# Patient Record
Sex: FEMALE | Race: WHITE | Employment: OTHER | URBAN - METROPOLITAN AREA
[De-identification: names, ages, dates, MRNs, and addresses within clinical notes are randomized per-mention and may not be internally consistent; named-entity substitution may affect disease eponyms.]

---

## 2021-01-28 ENCOUNTER — HOSPITAL ENCOUNTER (OUTPATIENT)
Dept: PHYSICAL THERAPY | Age: 86
End: 2021-01-28

## 2021-02-04 ENCOUNTER — HOSPITAL ENCOUNTER (OUTPATIENT)
Dept: PHYSICAL THERAPY | Age: 86
Discharge: HOME | End: 2021-02-04
Payer: MEDICARE

## 2021-02-04 PROCEDURE — 97162 PT EVAL MOD COMPLEX 30 MIN: CPT

## 2021-02-04 PROCEDURE — 97110 THERAPEUTIC EXERCISES: CPT

## 2021-02-04 NOTE — PROGRESS NOTES
[address] [place] [address] PHYSICAL THERAPY   [address],[facility], [facility] - Phone: [phone]  Fax: [phone] [facility]  Patient Name: New Weber : 1934   Medical   Diagnosis: Lymphedema [I89.0] Treatment Diagnosis: Lymphedema [I89.0]   Onset Date: 6 months ago     Referral Source: Bina Potts MD Start of [facility]): 2021   Prior Hospitalization: See medical history Provider #: [id number]   Prior Level of Function: I with ADL's, no use of AD, walks 1 mile daily   Comorbidities: HBP, Arthritis, Afib, Pacemaker   Medications: Verified on Patient Summary List   The Plan of Care and following information is based on the information from the initial evaluation.   ==================================================================================  Assessment / key information:  New Weber is a 80 y.o.  yo female with Generalized weakness of the BL LE's as well as Lymphedema in the BL LE's. Pt reports current symptoms include: heaviness, tightness, pain in the back of the legs and calves, and difficulty with doing daily activities. Pt reports pain ranges 0-6/10 located on the sides of the knees, back of the knees, and bottoms of the feet. Past medical history includes: Pacemaker, Afib, HBP, Arthritis. Functional limitations include: difficulty walking long distances, difficulty with ADL's such as housework,. Pt has 2 story house with 5 stairs to enter the home. Pt lives with  and wears compression socks daily. Pt walk dog 1 mile daily (weather dependent).   Current Exam findings: Posture: mild FHRS posture in standing, with mild decrease in lumbar lordosis, Gait decreased loi with slightly widened JAZZY with no use of AD, AROM: hip ROM limited by 25% in all directions, knee AROM limited by 25% in all directions, ankle limited by 25% in all directions, Gross Strength: Gross hip 4/5 in all directions, knee 4/5 in all directions, ankle 5/5 in all directions, Skin Integrity: mild dry texture with no noted pitting or skin changes, Sensation: intact to light touch in the feet and lower legs. Circumferential Measurements are listed below:   Left Right   MTP 20 21   Navicular 24.5 25   Ankle 25 24   Calf 40 39   Knee 40 39.5      The patient was instructed in a home exercise program to address the above findings/deficits. Pt will benefit from PT interventions to address the aforementioned deficits and allow pt to return to PLOF. Pt's therapy may include: manual lymphatic drainage, compression wrapping,  decongestive exercises, and patient education on skin care, signs and symptoms of infection, and self-management upon DC.      ==================================================================================  Eval Complexity: History HIGH Complexity :3+ comorbidities / personal factors will impact the outcome/ POC ;  Examination  HIGH Complexity : 4+ Standardized tests and measures addressing body structure, function, activity limitation and / or participation in recreation ; Presentation MEDIUM Complexity : Evolving with changing characteristics ; Decision Making Other outcome measures Professional judgement  MEDIUM;  Overall Complexity MEDIUM  Problem List: pain affecting function, decrease ROM, decrease strength, edema affecting function, impaired gait/ balance, decrease ADL/ functional abilitiies, decrease activity tolerance, decrease flexibility/ joint mobility and decrease transfer abilities   Treatment Plan may include any combination of the following: Therapeutic exercise, Therapeutic activities, Neuromuscular re-education, Physical agent/modality, Gait/balance training, Manual therapy, Aquatic therapy, Patient education, Self Care training, Functional mobility training, Home safety training, and Stair training  Patient / Family readiness to learn indicated by: asking questions, trying to perform skills, and interest  Persons(s) to be included in education: patient (P)   Barriers to Learning/Limitations: None  Measures taken, if barriers to learning:    Patient Goal (s): Improve leg pain   Patient self reported health status: good  Rehabilitation Potential: good  Goals:    Short Term Goals: To be accomplished in 6 treatments  1) Pt will be I with HEP in order to improve carryover from therapy. 2) Pt will report max leg pain <3/10 in the feet and lower legs in order to improve functional ADL's    Long Term Goals: To be accomplished in 12 treatments  1)Pt will be I with long term HEP in order to prepare for DC to self management. 3) Pt will increase ROM of affected limb by 25% in order to improve functional ADL's  4) Pt will increase strength of affected limb by 1/2 MMT in order to improve gait and ambulation     Frequency / Duration:   Patient to be seen  1-2  times per week for 8-12  treatments:  Patient / Caregiver education and instruction: exercises  Therapist Signature: Yecenia Mills PT, DPT   Date: 8/6/0345   Certification Period: 2/4/2021-5/5/2021 Time: 5:33 PM   ==================================================================================  I certify that the above Physical Therapy Services are being furnished while the patient is under my care. I agree with the treatment plan and certify that this therapy is necessary. Physician Signature:        Date:       Time:     Please sign and return to [facility] at [facility], [facility]. or you may fax the signed copy to [phone]. Thank you.

## 2021-02-04 NOTE — PROGRESS NOTES
PHYSICAL THERAPY - INITIAL EVALUATION & TREATMENT NOTE    Patient Name: Lili Buenrostro        Date: 2021  : 1934   yes Patient  Verified  Visit #:   1     Insurance: Payor: Ellie Duron / Plan: [place] MED[place] PART [place] & B / Product Type: Medicare /      In time: 500 Out time: 540   Total Treatment Time: 40     Medicare/BCBS Time Tracking (below)   Total Timed Codes (min):  10 1:1 Treatment Time:  10     TREATMENT AREA =  Lymphedema [I89.0]    SUBJECTIVE  Pain Level (on 0 to 10 scale):  0  / 10   Medication Changes/New allergies or changes in medical history, any new surgeries or procedures?    no  If yes, update Summary List   Subjective Functional Status/Changes:  []  No changes reported     SEE POC         OBJECTIVE    30 min Evaluation- SEE POC     10 min Therapeutic Exercise: see flow sheet   Rationale: To increased ROM, strength, decreased edema to improve the patients ability to perform functional ADL's    Billed With/As:   [] TE   [x] TA   [] Neuro   [] Self Care Patient Education: [x] Review HEP     [x] other: PATIENT GIVEN LYMPHEDEMA INFORMATION PACKET     Other Objective/Functional Measures:    SEE POC     Post Treatment Pain Level (on 0 to 10) scale:   0  / 10     ASSESSMENT  Assessment/Changes in Function:        Evaluation Code Complexity: History HIGH Complexity :3+ comorbidities / personal factors will impact the outcome/ POC ; Examination HIGH Complexity : 4+ Standardized tests and measures addressing body structure, function, activity limitation and / or participation in recreation ; Presentation MEDIUM Complexity : Evolving with changing characteristics ; Decision Making Other outcome measures professional judgement  MEDIUM;  Complexity MEDIUM    Justification for Eval Code Complexity:  Patient History : pacemaker, HBP, Afib  Examination SEE ABOVE EXAM  Clinical Presentation: evolving pain, weakness, edema  Clinical Decision Making : professional judgement         []  See Progress Note/Recertification   Patient will continue to benefit from skilled PT services to modify and progress therapeutic interventions, address functional mobility deficits, address ROM deficits, address strength deficits, analyze and address soft tissue restrictions and address lymphedema to attain remaining goals. Progress toward goals / Updated goals:    SEE POC     PLAN  []  Upgrade activities as tolerated yes Continue plan of care   []  Discharge due to :    [x]  Other: Pt will be seen 1-2 times per week for 12 sessions.       Therapist: Mayuri Ceja PT, DPT    Date: 2/4/2021 Time: 5:33 PM     Future Appointments   Date Time Provider Willard Jimenes   2/9/2021 11:15 AM Germán Scanlon, PT [facility]   2/15/2021 11:30 AM Germán Scanlon, PT [facility]   2/17/2021 11:30 AM Germán Scanlon, PT [facility]   2/23/2021 11:15 AM Germán Scanlon, PT [facility]   2/26/2021 11:00 AM Kendal SEYMOUR [facility]

## 2021-02-09 ENCOUNTER — HOSPITAL ENCOUNTER (OUTPATIENT)
Dept: PHYSICAL THERAPY | Age: 86
Discharge: HOME | End: 2021-02-09
Payer: MEDICARE

## 2021-02-09 PROCEDURE — 97530 THERAPEUTIC ACTIVITIES: CPT

## 2021-02-09 PROCEDURE — 97535 SELF CARE MNGMENT TRAINING: CPT

## 2021-02-09 NOTE — PROGRESS NOTES
PHYSICAL THERAPY - DAILY TREATMENT NOTE    Patient Name: Christa Pruett        Date: 2021  : 1934   yes Patient  Verified  Visit #:     Insurance: Payor: Joanne Barfield / Plan: [place] MEDICARE PART A & B / Product Type: Medicare /      In time: 1120 am Out time: 5052 am   Total Treatment Time: 36     Medicare/BCBS Time Tracking (below)   Total Timed Codes (min):  26 1:1 Treatment Time:  26     TREATMENT AREA =  Lymphedema [I89.0]    SUBJECTIVE  Pain Level (on 0 to 10 scale):  0 / 10   Medication Changes/New allergies or changes in medical history, any new surgeries or procedures?    no  If yes, update Summary List   Subjective Functional Status/Changes:  []  No changes reported     Pt reports no significant changes since IE. OBJECTIVE  Modalities Rationale: decrease pain and increase tissue extensibility to improve patient's ability to perform LE functional tasks and ADL   min [] Estim, type/location:                                     []  att     []  unatt     []  w/US     []  w/ice    []  w/heat    min []  Mechanical Traction: type/lbs                   []  pro   []  sup   []  int   []  cont    []  before manual    []  after manual    min []  Ultrasound, settings/location:      min []  Iontophoresis w/ dexamethasone, location:                                               []  take home patch       []  in clinic   10 min []  Ice     [x]  Heat    location/position:  To bilat knees s/p session in supine H/L    min []  Vasopneumatic Device, press/temp:     min []  Other:    [x] Skin assessment post-treatment (if applicable):    [x]  intact    []  redness- no adverse reaction     []redness  adverse reaction:        8 min Therapeutic Exercise:  [x]  See flow sheet   Rationale:      increase ROM and increase strength to improve the patient's ability to perform LE functional tasks and ADL     9 min Therapeutic Activity: [x]  See flow sheet   Rationale:    increase strength, improve coordination, improve balance and increase proprioception to improve the patient's ability to perform LE functional tasks and ADL    8 min Self Care: See pt education   Rationale:    Pt education to improve the patient's ability to perform exercises, adhere to PT POC    Billed With/As:  [] TE  [] TA  [] Neuro  [x] Self Care Patient Education: [x] Review HEP    [] Progressed/Changed HEP based on:   [x] positioning   [x] body mechanics   [] transfers   [x] heat/ice application    [] other:     Other Objective/Functional Measures: Added exercises per flow sheet     Post Treatment Pain Level (on 0 to 10) scale:   0  / 10     ASSESSMENT  Assessment/Changes in Function:     No adverse effects noted with treatment this date. Demo/tactile cues provided for all new exercises to ensure proper form. Cont per POC. []  See Progress Note/Recertification   Patient will continue to benefit from skilled PT services to modify and progress therapeutic interventions, address functional mobility deficits, address ROM deficits, address strength deficits, analyze and address soft tissue restrictions, analyze and cue movement patterns, analyze and modify body mechanics/ergonomics, assess and modify postural abnormalities, address imbalance/dizziness and instruct in home and community integration to attain remaining goals.    Progress toward goals / Updated goals:    First f/u s/p IE     PLAN  [x]  Upgrade activities as tolerated yes Continue plan of care   []  Discharge due to :    []  Other:      Therapist: Grace Garcia PT    Date: 2/9/2021 Time: 11:53 AM     Future Appointments   Date Time Provider Willard Jimenes   2/15/2021 11:30 AM Wilfred Guido PT [facility]   2/17/2021 11:30 AM Wilfred Guido PT [facility]   2/23/2021 11:15 AM Wilfred Guido PT [facility]   2/26/2021 11:00 AM Allison SEYMOUR [facility]

## 2021-02-15 ENCOUNTER — APPOINTMENT (OUTPATIENT)
Dept: PHYSICAL THERAPY | Age: 86
End: 2021-02-15
Payer: MEDICARE

## 2021-02-17 ENCOUNTER — HOSPITAL ENCOUNTER (OUTPATIENT)
Dept: PHYSICAL THERAPY | Age: 86
Discharge: HOME | End: 2021-02-17
Payer: MEDICARE

## 2021-02-17 PROCEDURE — 97530 THERAPEUTIC ACTIVITIES: CPT

## 2021-02-17 PROCEDURE — 97535 SELF CARE MNGMENT TRAINING: CPT

## 2021-02-17 NOTE — PROGRESS NOTES
PHYSICAL THERAPY - DAILY TREATMENT NOTE    Patient Name: Lucien Fowler        Date: 2021  : 1934   yes Patient  Verified  Visit #:   3   of   12  Insurance: Payor: Kumar Alt / Plan: [place] MEDICARE PART A & B / Product Type: Medicare /      In time: 5368 am Out time: 2924 pm   Total Treatment Time: 43     Medicare/BCBS Time Tracking (below)   Total Timed Codes (min):  33 1:1 Treatment Time:  33     TREATMENT AREA =  Lymphedema [I89.0]    SUBJECTIVE  Pain Level (on 0 to 10 scale):  0 / 10   Medication Changes/New allergies or changes in medical history, any new surgeries or procedures?    no  If yes, update Summary List   Subjective Functional Status/Changes:  []  No changes reported     Pt reports no pain this date. OBJECTIVE  Modalities Rationale: decrease pain and increase tissue extensibility to improve patient's ability to perform LE functional tasks and ADL   min [] Estim, type/location:                                     []  att     []  unatt     []  w/US     []  w/ice    []  w/heat    min []  Mechanical Traction: type/lbs                   []  pro   []  sup   []  int   []  cont    []  before manual    []  after manual    min []  Ultrasound, settings/location:      min []  Iontophoresis w/ dexamethasone, location:                                               []  take home patch       []  in clinic   10 min []  Ice     [x]  Heat    location/position:  To bilat knees s/p session in supine H/L    min []  Vasopneumatic Device, press/temp:     min []  Other:    [x] Skin assessment post-treatment (if applicable):    [x]  intact    []  redness- no adverse reaction     []redness  adverse reaction:        14  NC min Therapeutic Exercise:  [x]  See flow sheet   Rationale:      increase ROM and increase strength to improve the patient's ability to perform LE functional tasks and ADL     11 min Therapeutic Activity: [x]  See flow sheet   Rationale:    increase strength, improve coordination, improve balance and increase proprioception to improve the patient's ability to perform LE functional tasks and ADL    8 min Self Care: See pt education   Rationale:    Pt education to improve the patient's ability to perform exercises, adhere to PT POC    Billed With/As:  [] TE  [] TA  [] Neuro  [x] Self Care Patient Education: [x] Review HEP    [] Progressed/Changed HEP based on:   [x] positioning   [x] body mechanics   [] transfers   [x] heat/ice application    [] other:     Other Objective/Functional Measures: Added exercises per flow sheet     Post Treatment Pain Level (on 0 to 10) scale:   4  / 10     ASSESSMENT  Assessment/Changes in Function:     No adverse effects noted with treatment this date. Visual input via mirror provided for equal WB bilat during mini squats. Verbal cues and demo also provided for proper knee excursion to dec ant stress on knee. Progress as fatemeh. []  See Progress Note/Recertification   Patient will continue to benefit from skilled PT services to modify and progress therapeutic interventions, address functional mobility deficits, address ROM deficits, address strength deficits, analyze and address soft tissue restrictions, analyze and cue movement patterns, analyze and modify body mechanics/ergonomics, assess and modify postural abnormalities, address imbalance/dizziness and instruct in home and community integration to attain remaining goals. Progress toward goals / Updated goals:    · To be accomplished in 6 treatments:  1) Pt will be I with HEP in order to improve carryover from therapy.   2) Pt will report max leg pain <3/10 in the feet and lower legs in order to improve functional ADL's       PLAN  [x]  Upgrade activities as tolerated yes Continue plan of care   []  Discharge due to :    []  Other:      Therapist: Tri Olmstead PT    Date: 2/17/2021 Time: 11:30 AM     Future Appointments   Date Time Provider Willard Jimenes   2/23/2021 11:15 AM Chastity Killian, PT [facility] 2/26/2021 11:00 AM Mariia Reeves [facility]

## 2021-02-23 ENCOUNTER — APPOINTMENT (OUTPATIENT)
Dept: PHYSICAL THERAPY | Age: 86
End: 2021-02-23
Payer: MEDICARE

## 2021-02-26 ENCOUNTER — HOSPITAL ENCOUNTER (OUTPATIENT)
Dept: PHYSICAL THERAPY | Age: 86
Discharge: HOME | End: 2021-02-26
Payer: MEDICARE

## 2021-02-26 PROCEDURE — 97110 THERAPEUTIC EXERCISES: CPT

## 2021-02-26 NOTE — PROGRESS NOTES
PHYSICAL THERAPY - DAILY TREATMENT NOTE    Patient Name: Christa Pruett        Date: 2021  : 1934   yes Patient  Verified  Visit #:      12  Insurance: Payor: Joanne Barfield / Plan: [place] MEDICARE PART A & B / Product Type: Medicare /      In time: 11:10 Out time: 11:47   Total Treatment Time: 37     Medicare/BCBS Time Tracking (below)   Total Timed Codes (min):  27 1:1 Treatment Time:  27     TREATMENT AREA =  Lymphedema [I89.0]    SUBJECTIVE  Pain Level (on 0 to 10 scale): 0 / 10   Medication Changes/New allergies or changes in medical history, any new surgeries or procedures?    no  If yes, update Summary List   Subjective Functional Status/Changes:  []  No changes reported     Patient feels like her legs are always heavy (L>R) and makes it difficult for her to walk or stand for a long time. States that she notices posterior thigh pain randomly throughout her day. Denies any falls or red flags since LV. OBJECTIVE  Modalities Rationale: decrease pain and increase tissue extensibility to improve patient's ability to perform LE functional tasks and ADL   min [] Estim, type/location:                                     []  att     []  unatt     []  w/US     []  w/ice    []  w/heat    min []  Mechanical Traction: type/lbs                   []  pro   []  sup   []  int   []  cont    []  before manual    []  after manual    min []  Ultrasound, settings/location:      min []  Iontophoresis w/ dexamethasone, location:                                               []  take home patch       []  in clinic   10 min []  Ice     [x]  Heat    location/position:  To bilat knees s/p session in supine H/L    min []  Vasopneumatic Device, press/temp:     min []  Other:    [x] Skin assessment post-treatment (if applicable):    [x]  intact    []  redness- no adverse reaction     []redness  adverse reaction:        27 min Therapeutic Exercise:  [x]  See flow sheet   Rationale:      increase ROM and increase strength to improve the patient's ability to perform LE functional tasks and ADL       Billed With/As:  [x] TE  [] TA  [] Neuro  [] Self Care Patient Education: [x] Review HEP    [] Progressed/Changed HEP based on:   [] positioning   [] body mechanics   [] transfers   [] heat/ice application    [] other:     Other Objective/Functional Measures:    1:1 TE = 32'    *challenged with equal weightbearing during mini squats. Slight improvement with weight distribution with mirror. Performed sit to stands at high table instead. *added incline board due to pt's c/o \"heaviness\" and \"tightness\" in the posterior LE     Post Treatment Pain Level (on 0 to 10) scale:  0  / 10     ASSESSMENT  Assessment/Changes in Function:     Patient would continue to benefit from skilled PT in order to perform walking activities with decrease fall risk and improved functional strength/endurance. []  See Progress Note/Recertification   Patient will continue to benefit from skilled PT services to modify and progress therapeutic interventions, address functional mobility deficits, address ROM deficits, address strength deficits, analyze and address soft tissue restrictions, analyze and cue movement patterns, analyze and modify body mechanics/ergonomics, assess and modify postural abnormalities, address imbalance/dizziness and instruct in home and community integration to attain remaining goals. Progress toward goals / Updated goals:    · To be accomplished in 6 treatments:  1) Pt will be I with HEP in order to improve carryover from therapy.   2) Pt will report max leg pain <3/10 in the feet and lower legs in order to improve functional ADL's       PLAN  [x]  Upgrade activities as tolerated yes Continue plan of care   []  Discharge due to :    []  Other:      Therapist: GILL Llanos    Date: 2/26/2021 Time: 11:42 AM     Future Appointments   Date Time Provider Willard Jimenes   2/26/2021 11:00 AM Felix Hudson 3/2/2021  3:00 PM Hunter Arboleda,  [facility]   3/9/2021  2:45 PM Ethan Pool, PT [place] 1943

## 2021-03-01 ENCOUNTER — APPOINTMENT (OUTPATIENT)
Dept: PHYSICAL THERAPY | Age: 86
End: 2021-03-01
Payer: MEDICARE

## 2021-03-02 ENCOUNTER — HOSPITAL ENCOUNTER (OUTPATIENT)
Dept: PHYSICAL THERAPY | Age: 86
Discharge: HOME | End: 2021-03-02
Payer: MEDICARE

## 2021-03-02 PROCEDURE — 97110 THERAPEUTIC EXERCISES: CPT

## 2021-03-02 PROCEDURE — 97535 SELF CARE MNGMENT TRAINING: CPT

## 2021-03-02 NOTE — PROGRESS NOTES
[facility] PHYSICAL THERAPY   [facility] [address],[place], [address] [facility] - Phone: [phone]  Fax: [phone] PHYSICAL THERAPY          Patient Name: [place] : 1934   Treatment/Medical Diagnosis: Lymphedema [I89.0]   Onset Date: 6 months ago    Referral Source: Jessica Montoya MD Start of Care [facility]): 21   Prior Hospitalization: See Medical History Provider #: [id number]   Prior Level of Function: I with ADL's, no use of AD, walks 1 mile daily   Comorbidities: HTN, Arthritis, Afib, Pacemaker   Medications: Verified on Patient Summary List   Visits from [facility]: 5 Missed Visits: 1     SUMMARY OF TREATMENT  Skilled PT services have consisted of: ther ex, neuro re-ed, ther act, pt education, HEP, moist heat  CURRENT STATUS  Patient has made slow, steady progress with PT interventions for bilat LE lymphedema. Patient reports ~40% overall improvement since beginning PT. In the last 2 weeks, pain has ranged between 0-7/10 with an avg pain of 5/10. Notices most improvement with pain in RLE. Significant functional improvement denoted with +3 on GROC. Hip PROM: flex WNL bilat, ER R 40 L 35, IR R 25 L 35  Knee AROM: flex R 120 L 110, ext R -7 L -15  Ankle AROM: grossly WFL bilat with some discomfort L ankle t/o    Aggravating factors: prolonged walking    Goal/Measure of Progress Goal Met? 1. Pt will be I with HEP in order to improve carryover from therapy. Status at Last Eval: New goal Current Status: Pt performs 2x/dailty yes   2. Pt will report max leg pain <3/10 in the feet and lower legs in order to improve functional ADL's   Status at Last Eval: Max pain: 6/10 Current Status: Max pain: 7/10 no   3.  Pt will increase ROM of affected limb by 25% in order to improve functional ADL's   Status at Last Eval: Grossly limited 25% bilat Current Status: See above Progressing       Key Functional Changes/Progress: See above  Problem List: pain affecting function, decrease ROM, decrease strength, edema affecting function, impaired gait/ balance, decrease ADL/ functional abilitiies, decrease activity tolerance, decrease flexibility/ joint mobility and decrease transfer abilities   Treatment Plan may include any combination of the following: Therapeutic exercise, Therapeutic activities, Neuromuscular re-education, Physical agent/modality, Gait/balance training, Manual therapy, Patient education, Self Care training, Functional mobility training, Home safety training and Stair training  Patient Goal(s) has/have been updated and include(s):      Goals for this certification period include and are to be achieved in  4  weeks:  1) Pt will report max leg pain <3/10 in the feet and lower legs in order to improve functional ADL's  2) Pt will increase ROM of affected limb by 25% in order to improve functional ADL's  3) Pt will be I with long term HEP in order to prepare for DC to self management. 4) PT to demo bilat knee AROM WNL without pain in order to have improved functional mobility. Frequency / Duration:   Patient to be seen   1-2   times per week for   4    weeks:  Assessments/Recommendations: Continue seeing pt 1-2x/week x4 more weeks before reassess. If you have any questions/comments please contact us directly at [phone]. Thank you for allowing us to assist in the care of your patient.     Therapist Signature: Deryl Hodgkin, PT Date: 8/1/7078   Certification Period:  Reporting Period: 2/4/21 to 5/5/21 2/4/21 to 3/2/2021 Time: 3:56 PM   NOTE TO PHYSICIAN:  PLEASE COMPLETE THE ORDERS BELOW AND FAX TO   [facility] Physical Therapy: [phone]  If you are unable to process this request in 24 hours please contact our office: [phone]    ___ I have read the above report and request that my patient continue as recommended.   ___ I have read the above report and request that my patient continue therapy with the following changes/special instructions: ________________________________________________   ___ I have read the above report and request that my patient be discharged from therapy. I certify that the above Physical Therapy Services are being furnished while the patient is under my care. I agree with the treatment plan and certify that this therapy is necessary.     Physician Signature:        Date:       Time:                                        Sonido Patel MD

## 2021-03-02 NOTE — PROGRESS NOTES
PHYSICAL THERAPY - DAILY TREATMENT NOTE    Patient Name: Mode Romo        Date: 3/2/2021  : 1934   yes Patient  Verified  Visit #:      12  Insurance: Payor: Gilmer Keep / Plan: [place] MED[place] PART A & B / Product Type: Medicare /      In time: 302 pm Out time: 352 pm   Total Treatment Time: 50     Medicare/BCBS Time Tracking (below)   Total Timed Codes (min):  40 1:1 Treatment Time:  40     TREATMENT AREA =  Lymphedema [I89.0]    SUBJECTIVE  Pain Level (on 0 to 10 scale):  5 / 10   Medication Changes/New allergies or changes in medical history, any new surgeries or procedures?    no  If yes, update Summary List   Subjective Functional Status/Changes:  []  No changes reported     See Progress Note       OBJECTIVE  Modalities Rationale: decrease pain and increase tissue extensibility to improve patient's ability to perform LE functional tasks and ADL   min [] Estim, type/location:                                     []  att     []  unatt     []  w/US     []  w/ice    []  w/heat    min []  Mechanical Traction: type/lbs                   []  pro   []  sup   []  int   []  cont    []  before manual    []  after manual    min []  Ultrasound, settings/location:      min []  Iontophoresis w/ dexamethasone, location:                                               []  take home patch       []  in clinic   10 min []  Ice     [x]  Heat    location/position:  To post knee bilat in supine H/L s/p session    min []  Vasopneumatic Device, press/temp:     min []  Other:    [x] Skin assessment post-treatment (if applicable):    [x]  intact    []  redness- no adverse reaction     []redness  adverse reaction:        15 min Therapeutic Exercise:  [x]  See flow sheet   Rationale:      increase ROM and increase strength to improve the patient's ability to perform functional tasks and ADL      25 min Self Care: See pt education   Rationale:    Pt education to improve the patient's ability to adhere to PT POC    Billed With/As:  [] TE  [] TA  [] Neuro  [x] Self Care Patient Education: [x] Review HEP    [] Progressed/Changed HEP based on:   [] positioning   [] body mechanics   [] transfers   [] heat/ice application    [x] other: PT progress, POC     Other Objective/Functional Measures:    See Progress Note     Post Treatment Pain Level (on 0 to 10) scale:   0  / 10     ASSESSMENT  Assessment/Changes in Function:     See Progress Note     [x]  See Progress Note/Recertification   Patient will continue to benefit from skilled PT services to modify and progress therapeutic interventions, address functional mobility deficits, address ROM deficits, address strength deficits, analyze and address soft tissue restrictions, analyze and cue movement patterns, analyze and modify body mechanics/ergonomics, assess and modify postural abnormalities, address imbalance/dizziness and instruct in home and community integration to attain remaining goals.    Progress toward goals / Updated goals:    See Progress Note     PLAN  [x]  Upgrade activities as tolerated yes Continue plan of care   []  Discharge due to :    []  Other:      Therapist: Hitesh Lopez, PT    Date: 3/2/2021 Time: 2:58 PM     Future Appointments   Date Time Provider Willard Jimenes   3/2/2021  3:00 PM Isabella Thomson, PT [facility]   3/9/2021  2:45 PM Leatha Pool, PT [facility]

## 2021-03-09 ENCOUNTER — HOSPITAL ENCOUNTER (OUTPATIENT)
Dept: PHYSICAL THERAPY | Age: 86
Discharge: HOME | End: 2021-03-09
Payer: MEDICARE

## 2021-03-09 PROCEDURE — 97140 MANUAL THERAPY 1/> REGIONS: CPT

## 2021-03-09 PROCEDURE — 97110 THERAPEUTIC EXERCISES: CPT

## 2021-03-09 NOTE — PROGRESS NOTES
PHYSICAL THERAPY - DAILY TREATMENT NOTE    Patient Name: Francisco Sanchez        Date: 3/9/2021  : 1934   yes Patient  Verified  Visit #:      of   12  Insurance: Payor: Colton Brewer / Plan: [place] MEDICARE PART A & B / Product Type: Medicare /      In time: 245 Out time: 340   Total Treatment Time: 55     Medicare/BCBS Time Tracking (below)   Total Timed Codes (min):  45 1:1 Treatment Time:  45     TREATMENT AREA =  Lymphedema [I89.0]    SUBJECTIVE  Pain Level (on 0 to 10 scale):  7/ 10   Medication Changes/New allergies or changes in medical history, any new surgeries or procedures?    no  If yes, update Summary List   Subjective Functional Status/Changes:  []  No changes reported     Pt reports pain and tightness in the BL knees       OBJECTIVE  Modalities Rationale: decrease pain and increase tissue extensibility to improve patient's ability to perform LE functional tasks and ADL   min [] Estim, type/location:                                     []  att     []  unatt     []  w/US     []  w/ice    []  w/heat    min []  Mechanical Traction: type/lbs                   []  pro   []  sup   []  int   []  cont    []  before manual    []  after manual    min []  Ultrasound, settings/location:      min []  Iontophoresis w/ dexamethasone, location:                                               []  take home patch       []  in clinic   10 min []  Ice     [x]  Heat    location/position: To post knee bilat in supine H/L s/p session    min []  Vasopneumatic Device, press/temp:     min []  Other:    [x] Skin assessment post-treatment (if applicable):    [x]  intact    []  redness- no adverse reaction     []redness  adverse reaction:        35 min Therapeutic Exercise:  [x]  See flow sheet   Rationale:      increase ROM and increase strength to improve the patient's ability to perform functional tasks and ADL      10 min Manual Therapy STM BL quad, ITB, posterior knee.  PROM into flexion BL   Rationale:    Pt education to improve the patient's ability to adhere to PT POC    Billed With/As:  [] TE  [] TA  [] Neuro  [x] Self Care Patient Education: [x] Review HEP    [] Progressed/Changed HEP based on:   [] positioning   [] body mechanics   [] transfers   [] heat/ice application    [x] other: PT progress, POC     Other Objective/Functional Measures:    Modified program today due to patient not feeling progress as well as increased pain noted. Significant tightness of the L knee today     Standing TE held today due to higher pain levels over the last week- see flow sheet. Post Treatment Pain Level (on 0 to 10) scale:   0  / 10     ASSESSMENT  Assessment/Changes in Function:     Will continue with modified TE to progress towards decreased pain with walking/standing     [x]  See Progress Note/Recertification   Patient will continue to benefit from skilled PT services to modify and progress therapeutic interventions, address functional mobility deficits, address ROM deficits, address strength deficits, analyze and address soft tissue restrictions, analyze and cue movement patterns, analyze and modify body mechanics/ergonomics, assess and modify postural abnormalities, address imbalance/dizziness and instruct in home and community integration to attain remaining goals.    Progress toward goals / Updated goals:    PN last visit     PLAN  [x]  Upgrade activities as tolerated yes Continue plan of care   []  Discharge due to :    []  Other:      Therapist: Jacques Kenney PT    Date: 3/9/2021 Time: 2:58 PM     Future Appointments   Date Time Provider Willard Jimenes   3/9/2021  2:45 PM [id number]   3/11/2021 11:30 AM Jerod Pool, [address] [address]   3/16/2021  3:00 PM Sole , PT [facility]   3/18/2021 12:15 PM Sole , PT [facility]   3/23/2021  2:45 PM Sole , PT [facility]   3/25/2021  2:00 PM Sole , PT [facility]   3/30/2021  3:00 PM Sole Green, PT [facility]   4/1/2021  2:00 PM Marga Leyva, [facility]

## 2021-03-11 ENCOUNTER — HOSPITAL ENCOUNTER (OUTPATIENT)
Dept: PHYSICAL THERAPY | Age: 86
Discharge: HOME | End: 2021-03-11
Payer: MEDICARE

## 2021-03-11 PROCEDURE — 97110 THERAPEUTIC EXERCISES: CPT

## 2021-03-11 PROCEDURE — 97140 MANUAL THERAPY 1/> REGIONS: CPT

## 2021-03-11 NOTE — PROGRESS NOTES
PHYSICAL THERAPY - DAILY TREATMENT NOTE    Patient Name: Kendra Hatch        Date: 3/11/2021  : 1934   yes Patient  Verified  Visit #:     Insurance: Payor: Alyssa Pennington / Plan: [place] MEDICARE PART A & B / Product Type: Medicare /      In time: 7885 Out time:    Total Treatment Time: 50     Medicare/BCBS Time Tracking (below)   Total Timed Codes (min):  50 1:1 Treatment Time:  50     TREATMENT AREA =  Lymphedema [I89.0]    SUBJECTIVE  Pain Level (on 0 to 10 scale):  3/ 10   Medication Changes/New allergies or changes in medical history, any new surgeries or procedures?    no  If yes, update Summary List   Subjective Functional Status/Changes:  []  No changes reported     \"I don't know what you did last time, but I think it really helped with my pain levels. \"       OBJECTIVE  Modalities Rationale: decrease pain and increase tissue extensibility to improve patient's ability to perform LE functional tasks and ADL   min [] Estim, type/location:                                     []  att     []  unatt     []  w/US     []  w/ice    []  w/heat    min []  Mechanical Traction: type/lbs                   []  pro   []  sup   []  int   []  cont    []  before manual    []  after manual    min []  Ultrasound, settings/location:      min []  Iontophoresis w/ dexamethasone, location:                                               []  take home patch       []  in clinic   10 min []  Ice     [x]  Heat    location/position: To post knee bilat in supine H/L s/p session    min []  Vasopneumatic Device, press/temp:     min []  Other:    [x] Skin assessment post-treatment (if applicable):    [x]  intact    []  redness- no adverse reaction     []redness  adverse reaction:        30 min Therapeutic Exercise:  [x]  See flow sheet   Rationale:      increase ROM and increase strength to improve the patient's ability to perform functional tasks and ADL      10 min Manual Therapy STM BL quad, ITB, posterior knee.  PROM into flexion BL   Rationale:    Pt education to improve the patient's ability to adhere to PT POC    Billed With/As:  [] TE  [] TA  [] Neuro  [x] Self Care Patient Education: [x] Review HEP    [] Progressed/Changed HEP based on:   [] positioning   [] body mechanics   [] transfers   [] heat/ice application    [x] other: PT progress, POC     Other Objective/Functional Measures:    Continued with modified program today. Post Treatment Pain Level (on 0 to 10) scale:   0  / 10     ASSESSMENT  Assessment/Changes in Function:     Will continue to progress TE, however tailored down standing TE due to frustration and pain levels. [x]  See Progress Note/Recertification   Patient will continue to benefit from skilled PT services to modify and progress therapeutic interventions, address functional mobility deficits, address ROM deficits, address strength deficits, analyze and address soft tissue restrictions, analyze and cue movement patterns, analyze and modify body mechanics/ergonomics, assess and modify postural abnormalities, address imbalance/dizziness and instruct in home and community integration to attain remaining goals.    Progress toward goals / Updated goals:         PLAN  [x]  Upgrade activities as tolerated yes Continue plan of care   []  Discharge due to :    []  Other:      Therapist: Ortiz Izquierdo, PT    Date: 3/11/2021 Time: 2:58 PM     Future Appointments   Date Time Provider Willard Jimenes   3/11/2021 11:30 AM Merary Pool, PT [address] [id number]   3/16/2021  3:00 PM Wilfred Guido,  [address] [facility]   3/18/2021 12:15 PM Wilfred Guido,  [address] [facility]   3/23/2021  2:45 PM Wilfred Guido,  [address] [facility]   3/25/2021  2:00 PM Wilfred Guido,  [address] [facility]   3/30/2021  3:00 PM Wilfred Guido,  [address] [facility]   4/1/2021  2:00 PM Wilfred Guido,  [address] [facility]

## 2021-03-16 ENCOUNTER — HOSPITAL ENCOUNTER (OUTPATIENT)
Dept: PHYSICAL THERAPY | Age: 86
Discharge: HOME | End: 2021-03-16
Payer: MEDICARE

## 2021-03-16 PROCEDURE — 97535 SELF CARE MNGMENT TRAINING: CPT

## 2021-03-16 PROCEDURE — 97110 THERAPEUTIC EXERCISES: CPT

## 2021-03-16 NOTE — PROGRESS NOTES
PHYSICAL THERAPY - DAILY TREATMENT NOTE    Patient Name: Kendra Hatch        Date: 3/16/2021  : 1934   yes Patient  Verified  Visit #:   (8) 3   of   8  Insurance: Payor: Alyssa Pennington / Plan: [place] MEDICARE PART A & B / Product Type: Medicare /      In time: 300 pm Out time: 354 pm    Total Treatment Time: 54     Medicare/BCBS Time Tracking (below)   Total Timed Codes (min):  44 1:1 Treatment Time:  44     TREATMENT AREA =  Lymphedema [I89.0]    SUBJECTIVE  Pain Level (on 0 to 10 scale):  0 / 10   Medication Changes/New allergies or changes in medical history, any new surgeries or procedures?    no  If yes, update Summary List   Subjective Functional Status/Changes:  []  No changes reported     Pt reports having some pain yesterday, but today has no pain. It's the best she's felt in a long time. OBJECTIVE  Modalities Rationale: decrease pain and increase tissue extensibility to improve patient's ability to perform LE functional tasks and ADL   min [] Estim, type/location:                                     []  att     []  unatt     []  w/US     []  w/ice    []  w/heat    min []  Mechanical Traction: type/lbs                   []  pro   []  sup   []  int   []  cont    []  before manual    []  after manual    min []  Ultrasound, settings/location:      min []  Iontophoresis w/ dexamethasone, location:                                               []  take home patch       []  in clinic   10 min []  Ice     [x]  Heat    location/position:  To post knee bilat in supine H/L s/p session    min []  Vasopneumatic Device, press/temp:     min []  Other:    [x] Skin assessment post-treatment (if applicable):    [x]  intact    []  redness- no adverse reaction     []redness  adverse reaction:        26 min Therapeutic Exercise:  [x]  See flow sheet   Rationale:      increase ROM and increase strength to improve the patient's ability to perform LE functional tasks and ADL     8  NC min Manual Therapy: Myofascial rolling bilat quad and calf musculature   Rationale:      decrease pain, increase ROM, increase tissue extensibility and decrease trigger points to improve patient's ability to perform LE functional tasks and ADL  The manual therapy interventions were performed at a separate and distinct time from the therapeutic activities interventions. 10 min Self Care: See pt education   Rationale:    Pt education to improve the patient's ability to perform exercises, adhere to PT POC    Billed With/As:  [] TE  [] TA  [] Neuro  [x] Self Care Patient Education: [x] Review HEP    [] Progressed/Changed HEP based on:   [x] positioning   [x] body mechanics   [] transfers   [] heat/ice application    [] other:     Other Objective/Functional Measures:    Cont per modified program     Post Treatment Pain Level (on 0 to 10) scale:   0  / 10     ASSESSMENT  Assessment/Changes in Function:     No adverse effects noted with treatment this date. Cont with modification 2/2 pt positive response in past sessions. Myofascial adhesion noted L>R during manual intervention. Tactile cues provided for quad set isolation to ensure lack of compensation from glute musculature. []  See Progress Note/Recertification   Patient will continue to benefit from skilled PT services to modify and progress therapeutic interventions, address functional mobility deficits, address ROM deficits, address strength deficits, analyze and address soft tissue restrictions, analyze and cue movement patterns, analyze and modify body mechanics/ergonomics, assess and modify postural abnormalities, address imbalance/dizziness and instruct in home and community integration to attain remaining goals.    Progress toward goals / Updated goals:    · To be achieved in  4  weeks per PN 3/2/21:  1) Pt will report max leg pain <3/10 in the feet and lower legs in order to improve functional ADL's.  2) Pt will increase ROM of affected limb by 25% in order to improve functional ADL's. 3) Pt will be I with long term HEP in order to prepare for DC to self management. 4) PT to demo bilat knee AROM WNL without pain in order to have improved functional mobility.        PLAN  [x]  Upgrade activities as tolerated yes Continue plan of care   []  Discharge due to :    []  Other:      Therapist: Grace Garcia, PT    Date: 3/16/2021 Time: 3:00 PM     Future Appointments   Date Time Provider Willard Jimenes   3/18/2021 12:15 PM York Lata,  [address] [facility]   3/23/2021  2:45 PM York Goresthela,  [address] [facility]   3/25/2021  2:00 PM York Gores,  [address] [facility]   3/30/2021  3:00 PM York Gores,  [address] [facility]   4/1/2021  2:00 PM York Gores,  [address] [facility]

## 2021-03-18 ENCOUNTER — HOSPITAL ENCOUNTER (OUTPATIENT)
Dept: PHYSICAL THERAPY | Age: 86
Discharge: HOME | End: 2021-03-18
Payer: MEDICARE

## 2021-03-18 PROCEDURE — 97110 THERAPEUTIC EXERCISES: CPT

## 2021-03-18 PROCEDURE — 97140 MANUAL THERAPY 1/> REGIONS: CPT

## 2021-03-18 PROCEDURE — 97535 SELF CARE MNGMENT TRAINING: CPT

## 2021-03-18 NOTE — PROGRESS NOTES
PHYSICAL THERAPY - DAILY TREATMENT NOTE    Patient Name: Juan Young        Date: 3/18/2021  : 1934   yes Patient  Verified  Visit #:   9 4   of   8  Insurance: Payor: Ana Laura Jimenez / Plan: [place] MED[place]ARE PART A & B / Product Type: Medicare /      In time: 3223 pm Out time: 105 pm    Total Treatment Time: 48     Medicare/BCBS Time Tracking (below)   Total Timed Codes (min):  38 1:1 Treatment Time:  38     TREATMENT AREA =  Lymphedema [I89.0]    SUBJECTIVE  Pain Level (on 0 to 10 scale):  0 / 10   Medication Changes/New allergies or changes in medical history, any new surgeries or procedures?    no  If yes, update Summary List   Subjective Functional Status/Changes:  []  No changes reported     Pt reports that myofascial rolling was \"uncomfortable, but helpful\" when applied last session to L quad. She woke up with some pain in her L knee today, but is has largely subsided by time of session. OBJECTIVE  Modalities Rationale: decrease pain and increase tissue extensibility to improve patient's ability to perform LE functional tasks and ADL   min [] Estim, type/location:                                     []  att     []  unatt     []  w/US     []  w/ice    []  w/heat    min []  Mechanical Traction: type/lbs                   []  pro   []  sup   []  int   []  cont    []  before manual    []  after manual    min []  Ultrasound, settings/location:      min []  Iontophoresis w/ dexamethasone, location:                                               []  take home patch       []  in clinic   10 min []  Ice     [x]  Heat    location/position:  To post knee bilat in supine H/L s/p session    min []  Vasopneumatic Device, press/temp:     min []  Other:    [x] Skin assessment post-treatment (if applicable):    [x]  intact    []  redness- no adverse reaction     []redness  adverse reaction:        20 min Therapeutic Exercise:  [x]  See flow sheet   Rationale:      increase ROM and increase strength to improve the patient's ability to perform LE functional tasks and ADL     8  NC min Manual Therapy: Myofascial rolling bilat quad and calf musculature   Rationale:      decrease pain, increase ROM, increase tissue extensibility and decrease trigger points to improve patient's ability to perform LE functional tasks and ADL  The manual therapy interventions were performed at a separate and distinct time from the therapeutic activities interventions. 10 min Self Care: See pt education   Rationale:    Pt education to improve the patient's ability to perform exercises, adhere to PT POC    Billed With/As:  [] TE  [] TA  [] Neuro  [x] Self Care Patient Education: [x] Review HEP    [] Progressed/Changed HEP based on:   [x] positioning   [x] body mechanics   [] transfers   [] heat/ice application    [] other:     Other Objective/Functional Measures:    Performed exercise per flowsheet     Post Treatment Pain Level (on 0 to 10) scale:   0  / 10     ASSESSMENT  Assessment/Changes in Function:     No adverse effects noted with treatment this date. Modified depth of myofascial rolling to decrease discomfort during manual intervention. Pt would benefit from program progression nv as tolerated (noted in chart). []  See Progress Note/Recertification   Patient will continue to benefit from skilled PT services to modify and progress therapeutic interventions, address functional mobility deficits, address ROM deficits, address strength deficits, analyze and address soft tissue restrictions, analyze and cue movement patterns, analyze and modify body mechanics/ergonomics, assess and modify postural abnormalities, address imbalance/dizziness and instruct in home and community integration to attain remaining goals.    Progress toward goals / Updated goals:    · To be achieved in  4  weeks per PN 3/2/21:  1) Pt will report max leg pain <3/10 in the feet and lower legs in order to improve functional ADL's.  2) Pt will increase ROM of affected limb by 25% in order to improve functional ADL's.  3) Pt will be I with long term HEP in order to prepare for DC to self management. Progressing 3/18/21  4) PT to demo bilat knee AROM WNL without pain in order to have improved functional mobility.        PLAN  [x]  Upgrade activities as tolerated yes Continue plan of care   []  Discharge due to :    []  Other:      Therapist: Trenton Magana, PT    Date: 3/18/2021 Time: 12:15 PM     Future Appointments   Date Time Provider Willard Jimenes   3/23/2021  2:45 PM Hans Terry, PT [facility]   3/25/2021  2:00 PM Hans Terry, [facility]   3/30/2021  3:00 PM Hans Terry, PT [facility]   4/1/2021  2:00 PM Hans Terry, [address] [facility]

## 2021-03-23 ENCOUNTER — HOSPITAL ENCOUNTER (OUTPATIENT)
Dept: PHYSICAL THERAPY | Age: 86
Discharge: HOME | End: 2021-03-23
Payer: MEDICARE

## 2021-03-23 PROCEDURE — 97535 SELF CARE MNGMENT TRAINING: CPT

## 2021-03-23 PROCEDURE — 97110 THERAPEUTIC EXERCISES: CPT

## 2021-03-23 NOTE — PROGRESS NOTES
PHYSICAL THERAPY - DAILY TREATMENT NOTE    Patient Name: Andres Kumar        Date: 3/23/2021  : 1934   yes Patient  Verified  Visit #:   10 5   of   8  Insurance: Payor: Nathalie Chanors / Plan: [place] MEDICARE PART A & B / Product Type: Medicare /      In time: 300 pm Out time: 353 pm    Total Treatment Time: 53     Medicare/BCBS Time Tracking (below)   Total Timed Codes (min):  43 1:1 Treatment Time:  43     TREATMENT AREA =  Lymphedema [I89.0]    SUBJECTIVE  Pain Level (on 0 to 10 scale):  4 / 10   Medication Changes/New allergies or changes in medical history, any new surgeries or procedures?    no  If yes, update Summary List   Subjective Functional Status/Changes:  []  No changes reported     Pt reports that she feels sore today. OBJECTIVE  Modalities Rationale: decrease pain and increase tissue extensibility to improve patient's ability to perform LE functional tasks and ADL   min [] Estim, type/location:                                     []  att     []  unatt     []  w/US     []  w/ice    []  w/heat    min []  Mechanical Traction: type/lbs                   []  pro   []  sup   []  int   []  cont    []  before manual    []  after manual    min []  Ultrasound, settings/location:      min []  Iontophoresis w/ dexamethasone, location:                                               []  take home patch       []  in clinic   10 min []  Ice     [x]  Heat    location/position:  To post knee bilat in supine H/L s/p session    min []  Vasopneumatic Device, press/temp:     min []  Other:    [x] Skin assessment post-treatment (if applicable):    [x]  intact    []  redness- no adverse reaction     []redness  adverse reaction:        25 min Therapeutic Exercise:  [x]  See flow sheet   Rationale:      increase ROM and increase strength to improve the patient's ability to perform LE functional tasks and ADL     8  NC min Manual Therapy: Myofascial rolling bilat quad and calf musculature   Rationale:      decrease pain, increase ROM, increase tissue extensibility and decrease trigger points to improve patient's ability to perform LE functional tasks and ADL  The manual therapy interventions were performed at a separate and distinct time from the therapeutic activities interventions. 10 min Self Care: See pt education   Rationale:    Pt education to improve the patient's ability to perform exercises, adhere to PT POC    Billed With/As:  [] TE  [] TA  [] Neuro  [x] Self Care Patient Education: [x] Review HEP    [] Progressed/Changed HEP based on:   [x] positioning   [x] body mechanics   [] transfers   [] heat/ice application    [] other:     Other Objective/Functional Measures:    Resumed exercises per flowsheet     Post Treatment Pain Level (on 0 to 10) scale:   0  / 10     ASSESSMENT  Assessment/Changes in Function:     No adverse effects noted with treatment this date. Pt educated on benefits of OOB activity for inc strengthening despite some inc in soreness. Pt verbalized understanding. []  See Progress Note/Recertification   Patient will continue to benefit from skilled PT services to modify and progress therapeutic interventions, address functional mobility deficits, address ROM deficits, address strength deficits, analyze and address soft tissue restrictions, analyze and cue movement patterns, analyze and modify body mechanics/ergonomics, assess and modify postural abnormalities, address imbalance/dizziness and instruct in home and community integration to attain remaining goals. Progress toward goals / Updated goals:    · To be achieved in  4  weeks per PN 3/2/21:  1) Pt will report max leg pain <3/10 in the feet and lower legs in order to improve functional ADL's.  2) Pt will increase ROM of affected limb by 25% in order to improve functional ADL's.  3) Pt will be I with long term HEP in order to prepare for DC to self management.  Progressing 3/18/21  4) PT to demo bilat knee AROM WNL without pain in order to have improved functional mobility.        PLAN  [x]  Upgrade activities as tolerated yes Continue plan of care   []  Discharge due to :    []  Other:      Therapist: Debora Parson PT    Date: 3/23/2021 Time: 2:45 PM     Future Appointments   Date Time Provider Willard Jimenes   3/25/2021  2:00 PM Phan Ayon, [facility]   4/1/2021  2:00 PM Phan Ayon, PT [facility]   4/13/2021 12:00 PM Phan Ayon, [address]  [facility]

## 2021-03-25 ENCOUNTER — HOSPITAL ENCOUNTER (OUTPATIENT)
Dept: PHYSICAL THERAPY | Age: 86
Discharge: HOME | End: 2021-03-25
Payer: MEDICARE

## 2021-03-25 PROCEDURE — 97140 MANUAL THERAPY 1/> REGIONS: CPT

## 2021-03-25 PROCEDURE — 97535 SELF CARE MNGMENT TRAINING: CPT

## 2021-03-25 PROCEDURE — 97110 THERAPEUTIC EXERCISES: CPT

## 2021-03-25 NOTE — PROGRESS NOTES
PHYSICAL THERAPY - DAILY TREATMENT NOTE    Patient Name: Kemal Don        Date: 3/25/2021  : 1934   yes Patient  Verified  Visit #:   11 6   of   8  Insurance: Payor: Kobi Quiver / Plan: [place] MEDICARE PART A & B / Product Type: Medicare /      In time: 202 pm Out time: 255 pm    Total Treatment Time: 53     Medicare/BCBS Time Tracking (below)   Total Timed Codes (min):  43 1:1 Treatment Time:  43     TREATMENT AREA =  Lymphedema [I89.0]    SUBJECTIVE  Pain Level (on 0 to 10 scale):  2 / 10   Medication Changes/New allergies or changes in medical history, any new surgeries or procedures?    no  If yes, update Summary List   Subjective Functional Status/Changes:  []  No changes reported     Pt reports she did a lot of walking at the grocery store yesterday. She was very sore and tired at the end of the day yesterday, but isn't \"too bad\" today. OBJECTIVE  Modalities Rationale: decrease pain and increase tissue extensibility to improve patient's ability to perform LE functional tasks and ADL   min [] Estim, type/location:                                     []  att     []  unatt     []  w/US     []  w/ice    []  w/heat    min []  Mechanical Traction: type/lbs                   []  pro   []  sup   []  int   []  cont    []  before manual    []  after manual    min []  Ultrasound, settings/location:      min []  Iontophoresis w/ dexamethasone, location:                                               []  take home patch       []  in clinic   10 min []  Ice     [x]  Heat    location/position:  To post knee bilat in supine H/L s/p session    min []  Vasopneumatic Device, press/temp:     min []  Other:    [x] Skin assessment post-treatment (if applicable):    [x]  intact    []  redness- no adverse reaction     []redness  adverse reaction:        23 min Therapeutic Exercise:  [x]  See flow sheet   Rationale:      increase ROM and increase strength to improve the patient's ability to perform LE functional tasks and ADL     8 min Manual Therapy: Myofascial rolling bilat quad and calf musculature   Rationale:      decrease pain, increase ROM, increase tissue extensibility and decrease trigger points to improve patient's ability to perform LE functional tasks and ADL  The manual therapy interventions were performed at a separate and distinct time from the therapeutic activities interventions. 13 min Self Care: See pt education   Rationale:    Pt education to improve the patient's ability to perform exercises, adhere to PT POC    Billed With/As:  [] TE  [] TA  [] Neuro  [x] Self Care Patient Education: [x] Review HEP    [] Progressed/Changed HEP based on:   [x] positioning   [x] body mechanics   [] transfers   [] heat/ice application    [] other:     Other Objective/Functional Measures:    Resumed exercises per flowsheet     Post Treatment Pain Level (on 0 to 10) scale:   0  / 10     ASSESSMENT  Assessment/Changes in Function:     No adverse effects noted with treatment this date. Intermittent cues given for overall flow of exercise program. Pt due for ReCert nv (noted in chart). []  See Progress Note/Recertification   Patient will continue to benefit from skilled PT services to modify and progress therapeutic interventions, address functional mobility deficits, address ROM deficits, address strength deficits, analyze and address soft tissue restrictions, analyze and cue movement patterns, analyze and modify body mechanics/ergonomics, assess and modify postural abnormalities, address imbalance/dizziness and instruct in home and community integration to attain remaining goals.    Progress toward goals / Updated goals:    · To be achieved in  4  weeks per PN 3/2/21:  1) Pt will report max leg pain <3/10 in the feet and lower legs in order to improve functional ADL's.  2) Pt will increase ROM of affected limb by 25% in order to improve functional ADL's.  3) Pt will be I with long term HEP in order to prepare for DC to self management. Progressing 3/18/21  4) PT to demo bilat knee AROM WNL without pain in order to have improved functional mobility.        PLAN  [x]  Upgrade activities as tolerated yes Continue plan of care   []  Discharge due to :    []  Other:      Therapist: Linda Encarnacion, PT    Date: 3/25/2021 Time: 2:00 PM     Future Appointments   Date Time Provider Willard Jimenes   4/1/2021  2:00 PM Melisa Clarke [place] [facility]   4/6/2021  3:45 PM Melisa Clarke [address] [facility]

## 2021-03-30 ENCOUNTER — APPOINTMENT (OUTPATIENT)
Dept: PHYSICAL THERAPY | Age: 86
End: 2021-03-30
Payer: MEDICARE

## 2021-04-01 ENCOUNTER — HOSPITAL ENCOUNTER (OUTPATIENT)
Dept: PHYSICAL THERAPY | Age: 86
Discharge: HOME | End: 2021-04-01
Payer: MEDICARE

## 2021-04-01 PROCEDURE — 97110 THERAPEUTIC EXERCISES: CPT

## 2021-04-01 PROCEDURE — 97535 SELF CARE MNGMENT TRAINING: CPT

## 2021-04-01 PROCEDURE — 97140 MANUAL THERAPY 1/> REGIONS: CPT

## 2021-04-01 NOTE — PROGRESS NOTES
[address] [facility] PHYSICAL THERAPY   [facility] [address] [place] [address],[facility], [facility] - Phone: [phone]  Fax: [phone]  DISCHARGE SUMMARY       Patient Name: Oj Barton : 1934   Treatment/Medical Diagnosis: Lymphedema [I89.0]   Onset Date: 6 months ago    Referral Source: Fanny Moise MD [facility] of Care [facility] in [place]): 21   Prior Hospitalization: See Medical History Provider #: [id number]   Prior Level of Function: I with ADL's, no use of AD, walks 1 mile daily   Comorbidities: HTN, Arthritis, Afib, Pacemaker   Medications: Verified on Patient Summary List   Visits from [facility]: 12 Missed Visits: 1     SUMMARY OF TREATMENT  Skilled PT services have consisted of: ther ex, neuro re-ed, ther act, pt education, HEP, moist heat  CURRENT STATUS  Patient has made slow, steady progress with PT interventions for bilat LE pain and lymphedema. Patient reports 40% (no change from last PN) overall improvement since beginning PT. In the last 2 weeks, pain has ranged between 0-6/10 with an avg pain of 5/10. GROC score remains +3 from last PN. Hip PROM: flex WNL bilat, ER R 39 L 49, IR R 23 L 31  Knee AROM: flex R 120 L 108, ext R -8 L -12  Ankle AROM: grossly WFL bilat with no pain    Goal/Measure of Progress Goal Met? 1. Pt will report max leg pain <3/10 in the feet and lower legs in order to improve functional ADL's   Status at Last Eval: Max pain 7/10 Current Status: Max pain 6/10 Progressing   2. Pt will increase ROM of affected limb by 25% in order to improve functional ADL's   Status at Last Eval: Hip PROM: flex WNL bilat, ER R 40 L 35, IR R 25 L 35  Ankle AROM: grossly WFL bilat with some discomfort L ankle t/o Current Status: See above Partially met   3. Pt will be I with long term HEP in order to prepare for DC to self management.    Status at Last Eval: Pt performs 2x/daily Current Status: Pt performs 2x/daily yes   4.   PT to demo bilat knee AROM WNL without pain in order to have improved functional mobility. Status at Last Eval: Knee AROM: flex R 120 L 110, ext R -7 L -15 Current Status: See above no     Key Functional Changes/Progress: See above  Assessments/Recommendations: Discontinue therapy due to lack of appreciable progress towards goals. If you have any questions/comments please contact us directly at [phone]. Thank you for allowing us to assist in the care of your patient.     Therapist Signature: Shayne Gómez PT Date: 4/1/21   Reporting Period: 2/4/21 to 4/1/21 Time: 2:19 PM

## 2021-04-01 NOTE — PROGRESS NOTES
PHYSICAL THERAPY - DAILY TREATMENT NOTE    Patient Name: Steffi Ritter        Date: 2021  : 1934   yes Patient  Verified  Visit #:     Insurance: Payor: Ernst Mayes / Plan: [place] MEDICARE PART A & B / Product Type: Medicare /      In time: 152 pm Out time: 248 pm   Total Treatment Time: 56     Medicare/BCBS Time Tracking (below)   Total Timed Codes (min):  46 1:1 Treatment Time:  46     TREATMENT AREA =  Lymphedema [I89.0]    SUBJECTIVE  Pain Level (on 0 to 10 scale):  0 / 10   Medication Changes/New allergies or changes in medical history, any new surgeries or procedures?    no  If yes, update Summary List   Subjective Functional Status/Changes:  []  No changes reported     See Discharge Summary       OBJECTIVE  Modalities Rationale: decrease pain and increase tissue extensibility to improve patient's ability to perform functional tasks and ADL   min [] Estim, type/location:                                     []  att     []  unatt     []  w/US     []  w/ice    []  w/heat    min []  Mechanical Traction: type/lbs                   []  pro   []  sup   []  int   []  cont    []  before manual    []  after manual    min []  Ultrasound, settings/location:      min []  Iontophoresis w/ dexamethasone, location:                                               []  take home patch       []  in clinic   10 min []  Ice     [x]  Heat    location/position:  To bilat post knee s/p session    min []  Vasopneumatic Device, press/temp:     min []  Other:    [x] Skin assessment post-treatment (if applicable):    [x]  intact    []  redness- no adverse reaction     []redness  adverse reaction:        23 min Therapeutic Exercise:  [x]  See flow sheet   Rationale:      increase ROM and increase strength to improve the patient's ability to perform functional tasks and ADL     8 min Manual Therapy: Myofascial rolling bilat quad and calf musculature   Rationale:      decrease pain, increase ROM, increase tissue extensibility and decrease trigger points to improve patient's ability to perform functional tasks and ADL  The manual therapy interventions were performed at a separate and distinct time from the therapeutic activities interventions. 15 min Self Care: See pt education   Rationale:    Pt education to improve the patient's ability to perform self-management Independently upon DC    Billed With/As:  [] TE  [] TA  [] Neuro  [x] Self Care Patient Education: [x] Review HEP    [] Progressed/Changed HEP based on:   [] positioning   [] body mechanics   [] transfers   [] heat/ice application    [x] Other: DC instructions     Other Objective/Functional Measures:    See Discharge Summary     Post Treatment Pain Level (on 0 to 10) scale:   0  / 10     ASSESSMENT  Assessment/Changes in Function:     See Discharge Summary     []  See Progress Note/Recertification   DC patient at this time.    Progress toward goals / Updated goals:    See Discharge Summary     PLAN  []  Upgrade activities as tolerated no Continue plan of care   [x]  Discharge due to : Pt has met plateau in progress   []  Other:      Therapist: Manoj Berger PT    Date: 4/1/2021 Time: 2:26 PM     Future Appointments   Date Time Provider Willard Jimenes   4/6/2021  3:45 PM Jacqueline Washington, [address] [facility]

## 2021-04-06 ENCOUNTER — APPOINTMENT (OUTPATIENT)
Dept: PHYSICAL THERAPY | Age: 86
End: 2021-04-06
Payer: MEDICARE

## 2021-04-13 ENCOUNTER — APPOINTMENT (OUTPATIENT)
Dept: PHYSICAL THERAPY | Age: 86
End: 2021-04-13
Payer: MEDICARE